# Patient Record
Sex: MALE | Race: WHITE | URBAN - METROPOLITAN AREA
[De-identification: names, ages, dates, MRNs, and addresses within clinical notes are randomized per-mention and may not be internally consistent; named-entity substitution may affect disease eponyms.]

---

## 2019-11-03 ENCOUNTER — APPOINTMENT (OUTPATIENT)
Dept: CT IMAGING | Facility: HOSPITAL | Age: 29
End: 2019-11-03
Attending: EMERGENCY MEDICINE
Payer: MEDICAID

## 2019-11-03 PROCEDURE — 70450 CT HEAD/BRAIN W/O DYE: CPT | Performed by: EMERGENCY MEDICINE

## 2019-11-03 NOTE — ED PROVIDER NOTES
Patient Seen in: [facility] AND [facility] Emergency Department    History   Patient presents with:  Eval-P (psychiatric)  Altered Mental Status (neurologic)      HPI    Patient presents to the ED accompanied by PD.   Per his friend he was drinking at a bar [employer] rate and intact distal pulses. Pulmonary/Chest: Effort normal. No stridor. No respiratory distress. Abdominal: Soft. He exhibits no distension. Musculoskeletal:         General: No deformity or edema.      Neurological:   Somnolent, arousable, angry and Information (per [facility]): EtOH.  AMS. CT head without IV contrast    IMPRESSION:    No bleed or acute intracranial abnormality. No hydrocephalus, herniation or large vascular territory infarct. No acute calvarial fracture.     Report faxed performed by myself.     Condition upon leaving the department: Stable    Disposition and Plan     Clinical Impression:  Alcoholic intoxication without complication ([facility] [address]  (primary encounter diagnosis)  Injury of head, initial encounter  Scalp laceration, i

## 2019-11-03 NOTE — ED INITIAL ASSESSMENT (HPI)
Patient presents with EMS accompanied by PD. Per EMS, patient was drinking alcohol at a hotel bar when a friend noticed he was not acting himself. Friend took patient up to his hotel room where patient became increasingly altered.  Patient became agitated a

## 2019-11-03 NOTE — ED NOTES
Talked to Dr. Jesu Bianchi. Patient walking, eating and coherent. Per MD, patient can go home with his friends.

## 2019-11-03 NOTE — ED NOTES
Patient has laceration to back of his head when he fell getting to his hotel room; no LOC per witnesses.
